# Patient Record
Sex: FEMALE | Race: WHITE | Employment: OTHER | URBAN - METROPOLITAN AREA
[De-identification: names, ages, dates, MRNs, and addresses within clinical notes are randomized per-mention and may not be internally consistent; named-entity substitution may affect disease eponyms.]

---

## 2017-01-01 ENCOUNTER — APPOINTMENT (OUTPATIENT)
Dept: CT IMAGING | Age: 67
End: 2017-01-01
Attending: EMERGENCY MEDICINE
Payer: COMMERCIAL

## 2017-01-01 ENCOUNTER — HOSPITAL ENCOUNTER (EMERGENCY)
Age: 67
Discharge: HOME | End: 2017-01-01
Attending: EMERGENCY MEDICINE
Payer: COMMERCIAL

## 2017-01-01 ENCOUNTER — APPOINTMENT (OUTPATIENT)
Dept: GENERAL RADIOLOGY | Age: 67
End: 2017-01-01
Attending: EMERGENCY MEDICINE
Payer: COMMERCIAL

## 2017-01-01 VITALS
HEART RATE: 72 BPM | SYSTOLIC BLOOD PRESSURE: 139 MMHG | WEIGHT: 115 LBS | RESPIRATION RATE: 18 BRPM | TEMPERATURE: 98 F | BODY MASS INDEX: 18.48 KG/M2 | OXYGEN SATURATION: 98 % | HEIGHT: 66 IN | DIASTOLIC BLOOD PRESSURE: 58 MMHG

## 2017-01-01 DIAGNOSIS — J98.59 MEDIASTINAL MASS: ICD-10-CM

## 2017-01-01 DIAGNOSIS — N30.00 ACUTE CYSTITIS WITHOUT HEMATURIA: ICD-10-CM

## 2017-01-01 DIAGNOSIS — M54.9 UPPER BACK PAIN: Primary | ICD-10-CM

## 2017-01-01 DIAGNOSIS — J18.9 COMMUNITY ACQUIRED PNEUMONIA: ICD-10-CM

## 2017-01-01 LAB
ALBUMIN SERPL-MCNC: 3.8 G/DL (ref 3.5–4.8)
ALP LIVER SERPL-CCNC: 66 U/L (ref 55–142)
ALT SERPL-CCNC: 24 U/L (ref 14–54)
APTT PPP: 41 SECONDS (ref 25–34)
AST SERPL-CCNC: 26 U/L (ref 15–41)
ATRIAL RATE: 131 BPM
ATRIAL RATE: 71 BPM
BASOPHILS # BLD AUTO: 0.08 X10(3) UL (ref 0–0.1)
BASOPHILS NFR BLD AUTO: 1 %
BILIRUB SERPL-MCNC: 0.5 MG/DL (ref 0.1–2)
BILIRUB UR QL STRIP.AUTO: NEGATIVE
BUN BLD-MCNC: 14 MG/DL (ref 8–20)
CALCIUM BLD-MCNC: 9 MG/DL (ref 8.3–10.3)
CHLORIDE: 107 MMOL/L (ref 101–111)
CO2: 25 MMOL/L (ref 22–32)
COLOR UR AUTO: YELLOW
CREAT BLD-MCNC: 0.76 MG/DL (ref 0.55–1.02)
D-DIMER: 0.34 UG/ML FEU (ref 0–0.49)
EOSINOPHIL # BLD AUTO: 0.16 X10(3) UL (ref 0–0.3)
EOSINOPHIL NFR BLD AUTO: 2.1 %
ERYTHROCYTE [DISTWIDTH] IN BLOOD BY AUTOMATED COUNT: 13.2 % (ref 11.5–16)
FREE T4: 1.7 NG/DL (ref 0.9–1.8)
GLUCOSE BLD-MCNC: 106 MG/DL (ref 70–99)
GLUCOSE UR STRIP.AUTO-MCNC: NEGATIVE MG/DL
HCT VFR BLD AUTO: 43.5 % (ref 34–50)
HGB BLD-MCNC: 14.7 G/DL (ref 12–16)
IMMATURE GRANULOCYTE COUNT: 0.01 X10(3) UL (ref 0–1)
IMMATURE GRANULOCYTE RATIO %: 0.1 %
INR BLD: 1.02 (ref 0.89–1.12)
KETONES UR STRIP.AUTO-MCNC: NEGATIVE MG/DL
LYMPHOCYTES # BLD AUTO: 2.35 X10(3) UL (ref 0.9–4)
LYMPHOCYTES NFR BLD AUTO: 30.3 %
M PROTEIN MFR SERPL ELPH: 7.2 G/DL (ref 6.1–8.3)
MCH RBC QN AUTO: 30 PG (ref 27–33.2)
MCHC RBC AUTO-ENTMCNC: 33.8 G/DL (ref 31–37)
MCV RBC AUTO: 88.8 FL (ref 81–100)
MONOCYTES # BLD AUTO: 0.91 X10(3) UL (ref 0.1–0.6)
MONOCYTES NFR BLD AUTO: 11.7 %
NEUTROPHIL ABS PRELIM: 4.24 X10 (3) UL (ref 1.3–6.7)
NEUTROPHILS # BLD AUTO: 4.24 X10(3) UL (ref 1.3–6.7)
NEUTROPHILS NFR BLD AUTO: 54.8 %
NITRITE UR QL STRIP.AUTO: POSITIVE
P AXIS: 80 DEGREES
P AXIS: 82 DEGREES
P-R INTERVAL: 152 MS
P-R INTERVAL: 152 MS
PH UR STRIP.AUTO: 6.5 [PH] (ref 4.5–8)
PLATELET # BLD AUTO: 256 10(3)UL (ref 150–450)
POTASSIUM SERPL-SCNC: 3.8 MMOL/L (ref 3.6–5.1)
PROT UR STRIP.AUTO-MCNC: NEGATIVE MG/DL
PSA SERPL DL<=0.01 NG/ML-MCNC: 13.2 SECONDS (ref 11.8–14.2)
Q-T INTERVAL: 286 MS
Q-T INTERVAL: 382 MS
QRS DURATION: 72 MS
QRS DURATION: 74 MS
QTC CALCULATION (BEZET): 415 MS
QTC CALCULATION (BEZET): 422 MS
R AXIS: 79 DEGREES
R AXIS: 80 DEGREES
RBC # BLD AUTO: 4.9 X10(6)UL (ref 3.8–5.1)
RED CELL DISTRIBUTION WIDTH-SD: 42.7 FL (ref 35.1–46.3)
SODIUM SERPL-SCNC: 140 MMOL/L (ref 136–144)
SP GR UR STRIP.AUTO: 1.01 (ref 1–1.03)
T AXIS: 61 DEGREES
T AXIS: 85 DEGREES
TROPONIN: <0.046 NG/ML (ref ?–0.05)
UROBILINOGEN UR STRIP.AUTO-MCNC: 0.2 MG/DL
VENTRICULAR RATE: 131 BPM
VENTRICULAR RATE: 71 BPM
WBC # BLD AUTO: 7.8 X10(3) UL (ref 4–13)

## 2017-01-01 PROCEDURE — 80053 COMPREHEN METABOLIC PANEL: CPT | Performed by: EMERGENCY MEDICINE

## 2017-01-01 PROCEDURE — 85730 THROMBOPLASTIN TIME PARTIAL: CPT | Performed by: EMERGENCY MEDICINE

## 2017-01-01 PROCEDURE — 99285 EMERGENCY DEPT VISIT HI MDM: CPT

## 2017-01-01 PROCEDURE — 82570 ASSAY OF URINE CREATININE: CPT | Performed by: INTERNAL MEDICINE

## 2017-01-01 PROCEDURE — 84484 ASSAY OF TROPONIN QUANT: CPT | Performed by: EMERGENCY MEDICINE

## 2017-01-01 PROCEDURE — 82043 UR ALBUMIN QUANTITATIVE: CPT | Performed by: INTERNAL MEDICINE

## 2017-01-01 PROCEDURE — 71010 XR CHEST AP PORTABLE  (CPT=71010): CPT

## 2017-01-01 PROCEDURE — 93010 ELECTROCARDIOGRAM REPORT: CPT

## 2017-01-01 PROCEDURE — 93005 ELECTROCARDIOGRAM TRACING: CPT

## 2017-01-01 PROCEDURE — 87077 CULTURE AEROBIC IDENTIFY: CPT | Performed by: EMERGENCY MEDICINE

## 2017-01-01 PROCEDURE — 96374 THER/PROPH/DIAG INJ IV PUSH: CPT

## 2017-01-01 PROCEDURE — 87086 URINE CULTURE/COLONY COUNT: CPT | Performed by: EMERGENCY MEDICINE

## 2017-01-01 PROCEDURE — 71275 CT ANGIOGRAPHY CHEST: CPT

## 2017-01-01 PROCEDURE — 85610 PROTHROMBIN TIME: CPT | Performed by: EMERGENCY MEDICINE

## 2017-01-01 PROCEDURE — 85378 FIBRIN DEGRADE SEMIQUANT: CPT | Performed by: EMERGENCY MEDICINE

## 2017-01-01 PROCEDURE — 85025 COMPLETE CBC W/AUTO DIFF WBC: CPT | Performed by: EMERGENCY MEDICINE

## 2017-01-01 PROCEDURE — 81001 URINALYSIS AUTO W/SCOPE: CPT | Performed by: EMERGENCY MEDICINE

## 2017-01-01 PROCEDURE — 87186 SC STD MICRODIL/AGAR DIL: CPT | Performed by: EMERGENCY MEDICINE

## 2017-01-01 RX ORDER — LEVOFLOXACIN 500 MG/1
500 TABLET, FILM COATED ORAL DAILY
Qty: 10 TABLET | Refills: 0 | Status: SHIPPED | OUTPATIENT
Start: 2017-01-01 | End: 2017-01-01

## 2017-01-01 RX ORDER — HYDROMORPHONE HYDROCHLORIDE 1 MG/ML
0.5 INJECTION, SOLUTION INTRAMUSCULAR; INTRAVENOUS; SUBCUTANEOUS EVERY 30 MIN PRN
Status: DISCONTINUED | OUTPATIENT
Start: 2017-01-01 | End: 2017-01-01

## 2017-01-01 RX ORDER — HYDROMORPHONE HYDROCHLORIDE 2 MG/1
2 TABLET ORAL EVERY 4 HOURS PRN
Qty: 20 TABLET | Refills: 0 | Status: SHIPPED | OUTPATIENT
Start: 2017-01-01 | End: 2017-01-01

## 2017-01-30 ENCOUNTER — APPOINTMENT (OUTPATIENT)
Dept: GENERAL RADIOLOGY | Age: 67
End: 2017-01-30
Attending: EMERGENCY MEDICINE
Payer: COMMERCIAL

## 2017-01-30 ENCOUNTER — HOSPITAL ENCOUNTER (EMERGENCY)
Age: 67
Discharge: HOME | End: 2017-01-30
Attending: EMERGENCY MEDICINE
Payer: COMMERCIAL

## 2017-01-30 VITALS
OXYGEN SATURATION: 99 % | HEIGHT: 66 IN | BODY MASS INDEX: 18.48 KG/M2 | SYSTOLIC BLOOD PRESSURE: 129 MMHG | RESPIRATION RATE: 18 BRPM | TEMPERATURE: 98 F | WEIGHT: 115 LBS | HEART RATE: 94 BPM | DIASTOLIC BLOOD PRESSURE: 57 MMHG

## 2017-01-30 DIAGNOSIS — R07.89 CHEST PAIN, ATYPICAL: Primary | ICD-10-CM

## 2017-01-30 LAB
ALBUMIN SERPL-MCNC: 4 G/DL (ref 3.5–4.8)
ALP LIVER SERPL-CCNC: 71 U/L (ref 55–142)
ALT SERPL-CCNC: 22 U/L (ref 14–54)
AST SERPL-CCNC: 21 U/L (ref 15–41)
BASOPHILS # BLD AUTO: 0.08 X10(3) UL (ref 0–0.1)
BASOPHILS NFR BLD AUTO: 1 %
BILIRUB SERPL-MCNC: 0.6 MG/DL (ref 0.1–2)
BUN BLD-MCNC: 11 MG/DL (ref 8–20)
CALCIUM BLD-MCNC: 8.8 MG/DL (ref 8.3–10.3)
CHLORIDE: 108 MMOL/L (ref 101–111)
CO2: 23 MMOL/L (ref 22–32)
CREAT BLD-MCNC: 0.67 MG/DL (ref 0.55–1.02)
D-DIMER: 0.33 UG/ML FEU (ref 0–0.49)
EOSINOPHIL # BLD AUTO: 0.09 X10(3) UL (ref 0–0.3)
EOSINOPHIL NFR BLD AUTO: 1.2 %
ERYTHROCYTE [DISTWIDTH] IN BLOOD BY AUTOMATED COUNT: 13.2 % (ref 11.5–16)
GLUCOSE BLD-MCNC: 112 MG/DL (ref 70–99)
HCT VFR BLD AUTO: 44.1 % (ref 34–50)
HGB BLD-MCNC: 15 G/DL (ref 12–16)
IMMATURE GRANULOCYTE COUNT: 0.02 X10(3) UL (ref 0–1)
IMMATURE GRANULOCYTE RATIO %: 0.3 %
LYMPHOCYTES # BLD AUTO: 1.89 X10(3) UL (ref 0.9–4)
LYMPHOCYTES NFR BLD AUTO: 24.2 %
M PROTEIN MFR SERPL ELPH: 7.4 G/DL (ref 6.1–8.3)
MCH RBC QN AUTO: 29.3 PG (ref 27–33.2)
MCHC RBC AUTO-ENTMCNC: 34 G/DL (ref 31–37)
MCV RBC AUTO: 86.1 FL (ref 81–100)
MONOCYTES # BLD AUTO: 1 X10(3) UL (ref 0.1–0.6)
MONOCYTES NFR BLD AUTO: 12.8 %
NEUTROPHIL ABS PRELIM: 4.72 X10 (3) UL (ref 1.3–6.7)
NEUTROPHILS # BLD AUTO: 4.72 X10(3) UL (ref 1.3–6.7)
NEUTROPHILS NFR BLD AUTO: 60.5 %
PLATELET # BLD AUTO: 227 10(3)UL (ref 150–450)
POTASSIUM SERPL-SCNC: 3.2 MMOL/L (ref 3.6–5.1)
RBC # BLD AUTO: 5.12 X10(6)UL (ref 3.8–5.1)
RED CELL DISTRIBUTION WIDTH-SD: 41.6 FL (ref 35.1–46.3)
SODIUM SERPL-SCNC: 141 MMOL/L (ref 136–144)
TROPONIN: <0.046 NG/ML (ref ?–0.05)
TSI SER-ACNC: 0.34 MIU/ML (ref 0.35–5.5)
WBC # BLD AUTO: 7.8 X10(3) UL (ref 4–13)

## 2017-01-30 PROCEDURE — 84484 ASSAY OF TROPONIN QUANT: CPT | Performed by: EMERGENCY MEDICINE

## 2017-01-30 PROCEDURE — 99285 EMERGENCY DEPT VISIT HI MDM: CPT

## 2017-01-30 PROCEDURE — 85025 COMPLETE CBC W/AUTO DIFF WBC: CPT | Performed by: EMERGENCY MEDICINE

## 2017-01-30 PROCEDURE — 93010 ELECTROCARDIOGRAM REPORT: CPT

## 2017-01-30 PROCEDURE — 96360 HYDRATION IV INFUSION INIT: CPT

## 2017-01-30 PROCEDURE — 84439 ASSAY OF FREE THYROXINE: CPT | Performed by: EMERGENCY MEDICINE

## 2017-01-30 PROCEDURE — 84443 ASSAY THYROID STIM HORMONE: CPT | Performed by: EMERGENCY MEDICINE

## 2017-01-30 PROCEDURE — 80053 COMPREHEN METABOLIC PANEL: CPT | Performed by: EMERGENCY MEDICINE

## 2017-01-30 PROCEDURE — 71010 XR CHEST AP PORTABLE  (CPT=71010): CPT

## 2017-01-30 PROCEDURE — 85378 FIBRIN DEGRADE SEMIQUANT: CPT | Performed by: EMERGENCY MEDICINE

## 2017-01-30 PROCEDURE — 93005 ELECTROCARDIOGRAM TRACING: CPT

## 2017-01-30 RX ORDER — METOPROLOL SUCCINATE 100 MG/1
100 TABLET, EXTENDED RELEASE ORAL DAILY
COMMUNITY
End: 2017-01-01 | Stop reason: DRUGHIGH

## 2017-01-30 RX ORDER — AMLODIPINE BESYLATE 5 MG/1
5 TABLET ORAL DAILY
COMMUNITY
End: 2017-01-01

## 2017-01-30 RX ORDER — PRAVASTATIN SODIUM 40 MG
40 TABLET ORAL NIGHTLY
COMMUNITY
End: 2017-01-01

## 2017-01-30 RX ORDER — PROPYLTHIOURACIL 50 MG/1
TABLET ORAL 3 TIMES DAILY
COMMUNITY
End: 2017-01-01

## 2017-01-30 RX ORDER — HYDROCODONE BITARTRATE AND ACETAMINOPHEN 5; 325 MG/1; MG/1
1 TABLET ORAL EVERY 6 HOURS PRN
COMMUNITY

## 2017-01-30 RX ORDER — POTASSIUM CHLORIDE 20 MEQ/1
40 TABLET, EXTENDED RELEASE ORAL ONCE
Status: COMPLETED | OUTPATIENT
Start: 2017-01-30 | End: 2017-01-30

## 2017-01-30 NOTE — ED INITIAL ASSESSMENT (HPI)
PT C/O CHEST PAIN X 2 WKS . PT STATES PAIN TIGHTNESS, HEART BEAT IS SKIPPING BEATS AND BEATING FAST, HISTORY OF THYROID ISSUES.  PT STATES HER DOCTOR CHANGED HER BP MEDICINE WITHOUT KNOWING IT.

## 2017-01-30 NOTE — ED PROVIDER NOTES
Patient Seen in: THE [facility] Emergency Department In [place]    History   Patient presents with:  Chest Pain Angina (cardiovascular)    Stated Complaint: chest pain    HPI    14-year-old female with history of hyperthyroidism presents for evaluation of [place] in HPI.     Physical Exam       ED Triage Vitals   BP 01/30/17 1733 162/69 mmHg   Pulse 01/30/17 1733 125   Resp 01/30/17 1733 20   Temp 01/30/17 1733 98.2 °F (36.8 °C)   Temp src 01/30/17 1733 Temporal   SpO2 01/30/17 1733 99 %   O2 Device 01/30/17 1733 No Abnormal            Final result                 Please view results for these tests on the individual orders. TSH W REFLEX TO FREE T4   MAGNESIUM      EKG    Rate, intervals and axes as noted on EKG Report.   Rate: 131  Rhythm: Sinus Rhythm  Reading: No

## 2017-02-10 PROBLEM — F17.200 SMOKER: Status: ACTIVE | Noted: 2017-01-01

## 2017-02-10 PROBLEM — E05.00 TOXIC DIFFUSE GOITER: Status: ACTIVE | Noted: 2017-01-01

## 2017-02-10 PROBLEM — E11.40 TYPE 2 DIABETES, CONTROLLED, WITH NEUROPATHY (HCC): Status: ACTIVE | Noted: 2017-01-01

## 2017-02-10 PROBLEM — E78.5 HYPERLIPIDEMIA LDL GOAL <100: Status: ACTIVE | Noted: 2017-01-01

## 2017-02-10 PROBLEM — E05.90 HYPERTHYROIDISM: Status: ACTIVE | Noted: 2017-01-01

## 2017-02-10 PROBLEM — I10 HYPERTENSION, ESSENTIAL, BENIGN: Status: ACTIVE | Noted: 2017-01-01

## 2017-02-14 NOTE — ED PROVIDER NOTES
Patient Seen in: THE [facility] Emergency Department In [place]    History   Patient presents with:  Upper Extremity Injury (musculoskeletal)    Stated Complaint: shoulder pain and headache    HPI    Patient presents with pain across her upper back.   The stephen tablets daily   pregabalin 25 MG Oral Cap,  Take 1 tablet daily   aspirin 81 MG Oral Tab,  Take 1 tablet daily   AmLODIPine Besylate 5 MG Oral Tab,  Take 1 tablet daily   Pravastatin Sodium 40 MG Oral Tab,  Take 1 tablet daily   methimazole 10 MG Oral Tab, pupils equal round and reactive to light, oropharynx clear, uvula midline. Neck: Supple. Cardiovascular: Regular rate and rhythm, no murmurs. Back: No midline tenderness in the upper thoracic spine, some trapezius tenderness to palpation bilaterally.   R PLATELET    Narrative: The following orders were created for panel order CBC WITH DIFFERENTIAL WITH PLATELET.   Procedure                               Abnormality         Status                     ---------                               ----------- related to neoplastic disease as well. Calcified irregular airspace opacity in the right upper lobe anteriorly and medially could be related to old granulomatous disease.  There are several calcified nodules in the right middle lobe which could be related t right side X 3 days. She denies any shortness of breath or cough. Patient states she was diagnosed with breast cancer about 3 years ago but never had radiation or chemotherapy treatment. She has a history of hypertension.     FINDINGS:  Left perihilar op mg (0.5 mg Intravenous Given 2/13/17 1740)   iohexol (OMNIPAQUE) 350 MG/ML injection 100 mL (100 mL Intravenous Given 2/13/17 1911)     The patient was given Dilaudid for her pain which did improve it.   She was counseled regarding the concerning findings o

## 2017-02-15 NOTE — ED NOTES
PT NOTIFIED OF NEED TO CHANGE ABX. SHE STATES SHE IS HAVING MORE BACK PAIN. STATES SHE HAS A FOLLOW UP APPOINTMENT WITH HER DOCTOR THIS AFTERNOON.   RX CALLED TO [facility] [phone] PER PT REQUEST

## 2017-12-20 PROBLEM — R60.0 LOCALIZED EDEMA: Status: ACTIVE | Noted: 2017-01-01

## (undated) NOTE — ED AVS SNAPSHOT
[facility] in [facility] [address] [facility]    Phone:  [phone]    Fax:  [phone]           Evelio Amaya   MRN: [id number]    Department:  [facility] in [place]   Date of Visit:  1/3 IF THERE IS ANY CHANGE OR WORSENING OF YOUR CONDITION, CALL YOUR PRIMARY CARE PHYSICIAN AT ONCE OR RETURN IMMEDIATELY TO THE EMERGENCY DEPARTMENT.     If you have been prescribed any medication(s), please fill your prescription right away and begin taking t

## (undated) NOTE — ED AVS SNAPSHOT
[facility] in [address] [place] [address]    Phone:  [phone]    Fax:  [phone]           Juan Mijares   MRN: [id number]    Department:  [facility] in [place]   Date of Visit:  2/1 IF THERE IS ANY CHANGE OR WORSENING OF YOUR CONDITION, CALL YOUR PRIMARY CARE PHYSICIAN AT ONCE OR RETURN IMMEDIATELY TO THE EMERGENCY DEPARTMENT.     If you have been prescribed any medication(s), please fill your prescription right away and begin taking t

## (undated) NOTE — ED AVS SNAPSHOT
THE [facility] Emergency Department in [address] [facility]    Phone:  [phone]    Fax:  [phone]           Simran Lui   MRN: [id number]    Department:  THE [facility] Emergency Department in [place]   Date of Visit:  2/1 have any questions regarding your home medications, including potential side effects.               Medication List      START taking these medications     HYDROmorphone HCl 2 MG Tabs   Quantity:  20 tablet   Commonly known as:  DILAUDID   Take 1 tablet (2 receive this, we would really appreciate it if you could take the time to complete it. Thank you! You were examined and treated today on an urgent basis only. This was not a substitute for ongoing medical care.  Often, one Emergency Department visit d Racheal Red [address] ([address]) [address]  [facility] [address] [facility] [address] ([facility]) [phone] [address] [facility]. ([address]) [phone]- COMPARISON:  [place], XR CHEST AP PORTABLE  (CPT=71010), 1/30/2017, 17:39. [place], XR CHEST AP PORTABLE  (CPT=71010), 2/13/2017, 17:41.      INDICATIONS:  shoulder pain, abnl CXR, borderline ddimer     TECHNIQUE:  IV contrast-enhanced multislice CT PLEURA:  Normal. No pleural effusion. CHEST WALL:  Normal.  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 BONES:  Mild degenerative changes of the thoracic spine. No suspicious lytic or blastic lesion. .               XR CHEST AP Enter your [employer] Activation Code exactly as it appears below along with your Zip Code and Date of Birth to complete the sign-up process. If you do not sign up before the expiration date, you must request a new code.     Your unique Enli[employer] Access Code: [email or web address]

## (undated) NOTE — ED AVS SNAPSHOT
THE [facility] Emergency Department in [address] [facility]    Phone:  [phone]    Fax:  [phone]           Toby Paresh   MRN: [id number]    Department:  THE [facility] Emergency Department in [place]   Date of Visit:  1/3 Pediatric 443 [id number] Emergency Department   [phone]       To Check ER Wait Times:  TEXT 'ERwait' to [id number]      Click [email or web address]      Or call [phone]    If you have any problems with your follow-up, please call our case [facility] before you leave. After you leave, you should follow the attached instructions. I have read and understand the instructions given to me by my caregivers. 24-Hour Pharmacies        Pharmacy Address Phone Number   [address] Final result    Impression:    PROCEDURE:  XR CHEST AP PORTABLE (CPT=71010)     TECHNIQUE:  AP chest radiograph was obtained. COMPARISON:  None.      INDICATIONS:  chest pain     PATIENT STATED HISTORY:  Patient has pain and tightness across chest and